# Patient Record
Sex: MALE | Race: WHITE | NOT HISPANIC OR LATINO | Employment: FULL TIME | URBAN - METROPOLITAN AREA
[De-identification: names, ages, dates, MRNs, and addresses within clinical notes are randomized per-mention and may not be internally consistent; named-entity substitution may affect disease eponyms.]

---

## 2018-10-01 ENCOUNTER — APPOINTMENT (EMERGENCY)
Dept: RADIOLOGY | Facility: HOSPITAL | Age: 33
End: 2018-10-01
Payer: COMMERCIAL

## 2018-10-01 ENCOUNTER — HOSPITAL ENCOUNTER (EMERGENCY)
Facility: HOSPITAL | Age: 33
Discharge: HOME | End: 2018-10-01
Attending: EMERGENCY MEDICINE
Payer: COMMERCIAL

## 2018-10-01 VITALS
RESPIRATION RATE: 18 BRPM | TEMPERATURE: 98.7 F | HEART RATE: 92 BPM | SYSTOLIC BLOOD PRESSURE: 130 MMHG | WEIGHT: 250 LBS | OXYGEN SATURATION: 96 % | DIASTOLIC BLOOD PRESSURE: 77 MMHG | BODY MASS INDEX: 39.16 KG/M2

## 2018-10-01 DIAGNOSIS — J40 BRONCHITIS: Primary | ICD-10-CM

## 2018-10-01 DIAGNOSIS — R07.9 CHEST PAIN: ICD-10-CM

## 2018-10-01 LAB
ALBUMIN SERPL BCP-MCNC: 3.9 G/DL (ref 3.5–5)
ALP SERPL-CCNC: 63 U/L (ref 46–116)
ALT SERPL W P-5'-P-CCNC: 53 U/L (ref 12–78)
ANION GAP SERPL CALCULATED.3IONS-SCNC: 7 MMOL/L (ref 4–13)
AST SERPL W P-5'-P-CCNC: 28 U/L (ref 5–45)
BASOPHILS # BLD MANUAL: 0 THOUSAND/UL (ref 0–0.1)
BASOPHILS NFR MAR MANUAL: 0 % (ref 0–1)
BILIRUB SERPL-MCNC: 0.5 MG/DL (ref 0.2–1)
BUN SERPL-MCNC: 10 MG/DL (ref 5–25)
CALCIUM SERPL-MCNC: 8.6 MG/DL (ref 8.3–10.1)
CHLORIDE SERPL-SCNC: 103 MMOL/L (ref 100–108)
CO2 SERPL-SCNC: 27 MMOL/L (ref 21–32)
CREAT SERPL-MCNC: 1.39 MG/DL (ref 0.6–1.3)
DEPRECATED D DIMER PPP: 300 NG/ML (FEU) (ref 190–520)
EOSINOPHIL # BLD MANUAL: 0 THOUSAND/UL (ref 0–0.4)
EOSINOPHIL NFR BLD MANUAL: 0 % (ref 0–6)
ERYTHROCYTE [DISTWIDTH] IN BLOOD BY AUTOMATED COUNT: 14 % (ref 11.6–15.1)
GFR SERPL CREATININE-BSD FRML MDRD: 67 ML/MIN/1.73SQ M
GLUCOSE SERPL-MCNC: 127 MG/DL (ref 65–140)
HCT VFR BLD AUTO: 45.2 % (ref 36.5–49.3)
HGB BLD-MCNC: 15.3 G/DL (ref 12–17)
LYMPHOCYTES # BLD AUTO: 2.85 THOUSAND/UL (ref 0.6–4.47)
LYMPHOCYTES # BLD AUTO: 20 % (ref 14–44)
MCH RBC QN AUTO: 28.1 PG (ref 26.8–34.3)
MCHC RBC AUTO-ENTMCNC: 33.8 G/DL (ref 31.4–37.4)
MCV RBC AUTO: 83 FL (ref 82–98)
MONOCYTES # BLD AUTO: 0.86 THOUSAND/UL (ref 0–1.22)
MONOCYTES NFR BLD: 6 % (ref 4–12)
NEUTROPHILS # BLD MANUAL: 10.55 THOUSAND/UL (ref 1.85–7.62)
NEUTS SEG NFR BLD AUTO: 74 % (ref 43–75)
PLATELET # BLD AUTO: 320 THOUSANDS/UL (ref 149–390)
PLATELET BLD QL SMEAR: ADEQUATE
PMV BLD AUTO: 9.7 FL (ref 8.9–12.7)
POTASSIUM SERPL-SCNC: 3.9 MMOL/L (ref 3.5–5.3)
PROT SERPL-MCNC: 7.6 G/DL (ref 6.4–8.2)
RBC # BLD AUTO: 5.44 MILLION/UL (ref 3.88–5.62)
RBC MORPH BLD: NORMAL
SODIUM SERPL-SCNC: 137 MMOL/L (ref 136–145)
TOTAL CELLS COUNTED SPEC: 100
TROPONIN I SERPL-MCNC: <0.02 NG/ML
WBC # BLD AUTO: 14.26 THOUSAND/UL (ref 4.31–10.16)

## 2018-10-01 PROCEDURE — 85007 BL SMEAR W/DIFF WBC COUNT: CPT | Performed by: EMERGENCY MEDICINE

## 2018-10-01 PROCEDURE — 71046 X-RAY EXAM CHEST 2 VIEWS: CPT

## 2018-10-01 PROCEDURE — 85027 COMPLETE CBC AUTOMATED: CPT | Performed by: EMERGENCY MEDICINE

## 2018-10-01 PROCEDURE — 99285 EMERGENCY DEPT VISIT HI MDM: CPT

## 2018-10-01 PROCEDURE — 93005 ELECTROCARDIOGRAM TRACING: CPT

## 2018-10-01 PROCEDURE — 36415 COLL VENOUS BLD VENIPUNCTURE: CPT | Performed by: EMERGENCY MEDICINE

## 2018-10-01 PROCEDURE — 84484 ASSAY OF TROPONIN QUANT: CPT | Performed by: EMERGENCY MEDICINE

## 2018-10-01 PROCEDURE — 85379 FIBRIN DEGRADATION QUANT: CPT | Performed by: EMERGENCY MEDICINE

## 2018-10-01 PROCEDURE — 80053 COMPREHEN METABOLIC PANEL: CPT | Performed by: EMERGENCY MEDICINE

## 2018-10-01 RX ORDER — AZITHROMYCIN 250 MG/1
TABLET, FILM COATED ORAL
Qty: 6 TABLET | Refills: 0 | Status: SHIPPED | OUTPATIENT
Start: 2018-10-01 | End: 2018-10-05

## 2018-10-01 RX ORDER — AZITHROMYCIN 250 MG/1
500 TABLET, FILM COATED ORAL ONCE
Status: COMPLETED | OUTPATIENT
Start: 2018-10-01 | End: 2018-10-01

## 2018-10-01 RX ADMIN — AZITHROMYCIN MONOHYDRATE 500 MG: 250 TABLET ORAL at 23:36

## 2018-10-02 ENCOUNTER — TELEPHONE (OUTPATIENT)
Dept: ADMINISTRATIVE | Facility: OTHER | Age: 33
End: 2018-10-02

## 2018-10-02 LAB
ATRIAL RATE: 97 BPM
P AXIS: 45 DEGREES
PR INTERVAL: 154 MS
QRS AXIS: 53 DEGREES
QRSD INTERVAL: 84 MS
QT INTERVAL: 340 MS
QTC INTERVAL: 431 MS
T WAVE AXIS: 25 DEGREES
VENTRICULAR RATE: 97 BPM

## 2018-10-02 PROCEDURE — 93010 ELECTROCARDIOGRAM REPORT: CPT | Performed by: INTERNAL MEDICINE

## 2018-10-02 NOTE — DISCHARGE INSTRUCTIONS

## 2018-10-02 NOTE — ED PROVIDER NOTES
History  Chief Complaint   Patient presents with    Chest Pain     INTERMITTENT CHEST PAIN SINCE 7AM NO SOB HAS HAD URI RECENTLY     Pt with c/o intermittent chest pressure since 7am  He localizes pain to epigastric region, it is of spontaneous onset and resolution  Pain lasts approx 15secs at a time, and is not worsened with exertion or inspiration  He denies a hx of similar pain  None       Past Medical History:   Diagnosis Date    Kidney calculus        Past Surgical History:   Procedure Laterality Date    APPENDECTOMY      FRACTURE SURGERY      VA OPEN TX RADIAL & ULNAR SHAFT FX FIX RADIUS AND ULNA Left 8/2/2016    Procedure: radial and ulnar shaft fractures OPEN REDUCTION W/ INTERNAL FIXATION ;  Surgeon: Leopold Mitts, MD;  Location: [facility];  Service: Orthopedics       History reviewed  No pertinent family history  I have reviewed and agree with the history as documented  Social History   Substance Use Topics    Smoking status: Former Smoker     Types: Cigarettes    Smokeless tobacco: Never Used      Comment: VAPE    Alcohol use No        Review of Systems   Constitutional: Negative for chills and fever  Respiratory: Positive for cough  Negative for shortness of breath  Cardiovascular: Positive for chest pain  All other systems reviewed and are negative  Physical Exam  Physical Exam   Constitutional: He is oriented to person, place, and time  He appears well-developed and well-nourished  HENT:   Head: Normocephalic and atraumatic  Eyes: Pupils are equal, round, and reactive to light  EOM are normal    Cardiovascular: Normal rate, regular rhythm and normal heart sounds  Pulmonary/Chest: Effort normal and breath sounds normal    Abdominal: Soft  Bowel sounds are normal  He exhibits no distension and no mass  There is no tenderness  There is no rebound and no guarding  Neurological: He is alert and oriented to person, place, and time     Skin: Skin is warm and dry    Psychiatric: He has a normal mood and affect  His behavior is normal  Judgment and thought content normal    Nursing note and vitals reviewed  Vital Signs  ED Triage Vitals [10/01/18 2113]   Temperature Pulse Respirations Blood Pressure SpO2   98 7 °F (37 1 °C) 91 (!) 24 149/82 95 %      Temp Source Heart Rate Source Patient Position - Orthostatic VS BP Location FiO2 (%)   Tympanic Monitor Lying Left arm --      Pain Score       4           Vitals:    10/01/18 2200 10/01/18 2215 10/01/18 2230 10/01/18 2245   BP:  130/77     Pulse: 96 94 92 92   Patient Position - Orthostatic VS:           Visual Acuity      ED Medications  Medications   azithromycin (ZITHROMAX) tablet 500 mg (500 mg Oral Given 10/1/18 2336)       Diagnostic Studies  Results Reviewed     Procedure Component Value Units Date/Time    Comprehensive metabolic panel [[id number]]  (Abnormal) Collected:  10/01/18 2147    Lab Status:  Final result Specimen:  Blood from Hand, Left Updated:  10/01/18 2230     Sodium 137 mmol/L      Potassium 3 9 mmol/L      Chloride 103 mmol/L      CO2 27 mmol/L      ANION GAP 7 mmol/L      BUN 10 mg/dL      Creatinine 1 39 (H) mg/dL      Glucose 127 mg/dL      Calcium 8 6 mg/dL      AST 28 U/L      ALT 53 U/L      Alkaline Phosphatase 63 U/L      Total Protein 7 6 g/dL      Albumin 3 9 g/dL      Total Bilirubin 0 50 mg/dL      eGFR 67 ml/min/1 73sq m     Narrative:         National Kidney Disease Education Program recommendations are as follows:  GFR calculation is accurate only with a steady state creatinine  Chronic Kidney disease less than 60 ml/min/1 73 sq  meters  Kidney failure less than 15 ml/min/1 73 sq  meters      CBC and differential [[id number]]  (Abnormal) Collected:  10/01/18 2147    Lab Status:  Final result Specimen:  Blood from Hand, Left Updated:  10/01/18 2218     WBC 14 26 (H) Thousand/uL      RBC 5 44 Million/uL      Hemoglobin 15 3 g/dL      Hematocrit 45 2 %      MCV 83 fL      MCH 28 1 pg MCHC 33 8 g/dL      RDW 14 0 %      MPV 9 7 fL      Platelets 278 Thousands/uL     Narrative: This is an appended report  These results have been appended to a previously verified report  Troponin I [[id number]]  (Normal) Collected:  10/01/18 2147    Lab Status:  Final result Specimen:  Blood from Hand, Left Updated:  10/01/18 2216     Troponin I <0 02 ng/mL     D-Dimer [[id number]]  (Normal) Collected:  10/01/18 2147    Lab Status:  Final result Specimen:  Blood from Hand, Left Updated:  10/01/18 2209     D-Dimer, Quant 300 ng/ml (FEU)                  XR chest 2 views   Final Result by Magdiel Bowen MD (10/02 1208)      No acute cardiopulmonary disease              Workstation performed: [id number]                    Procedures  ECG 12 Lead Documentation  Date/Time: 10/1/2018 9:15 PM  Performed by: Kiara Flores by: Shirley Rea     Indications / Diagnosis:  Chest pain  ECG reviewed by me, the ED Provider: yes    Patient location:  ED  Previous ECG:     Previous ECG:  Unavailable    Comparison to cardiac monitor: Yes    Interpretation:     Interpretation: normal    Rate:     ECG rate:  97bpm    ECG rate assessment: normal    Rhythm:     Rhythm: sinus rhythm             Phone Contacts  ED Phone Contact    ED Course         HEART Risk Score      Most Recent Value   History  0 Filed at: 10/01/2018 2300   ECG  0 Filed at: 10/01/2018 2300   Age  0 Filed at: 10/01/2018 2300   Risk Factors  0 Filed at: 10/01/2018 2300   Troponin  0 Filed at: 10/01/2018 2300   Heart Score Risk Calculator   History  0 Filed at: 10/01/2018 2300   ECG  0 Filed at: 10/01/2018 2300   Age  0 Filed at: 10/01/2018 2300   Risk Factors  0 Filed at: 10/01/2018 2300   Troponin  0 Filed at: 10/01/2018 2300   HEART Score  0 Filed at: 10/01/2018 2300   HEART Score  0 Filed at: 10/01/2018 2300                            MDM  Number of Diagnoses or Management Options  Diagnosis management comments: Pt likely with bronchitis  Will d/c to home with zithromax    CritCare Time    Disposition  Final diagnoses:   Bronchitis   Chest pain     Time reflects when diagnosis was documented in both MDM as applicable and the Disposition within this note     Time User Action Codes Description Comment    10/1/2018 11:30 PM Aaron Castelan Add [J40] Bronchitis     10/1/2018 11:30 PM Aaron Castelan Add [R07 9] Chest pain       ED Disposition     ED Disposition Condition Comment    Discharge  [facility] discharge to home/self care  Condition at discharge: Stable        Follow-up Information     Follow up With Specialties Details Why Contact Info    Martha Soria MD [facility] Schedule an appointment as soon as possible for a visit in 1 day for follow up [id number] [facility] [id number]  [phone]            Discharge Medication List as of 10/1/2018 11:32 PM      START taking these medications    Details   azithromycin (ZITHROMAX) 250 mg tablet Take 1 tablet daily x 4 days, Normal           No discharge procedures on file      ED Provider  Electronically Signed by           Indigo Pineda DO  10/07/18 [id number]

## 2018-10-02 NOTE — TELEPHONE ENCOUNTER
S/W patient who was picking up his medications  He feels ok  Declined follow up appointment  Advised patient to call [facility] if he does need anything  ED visit documented in ED log